# Patient Record
Sex: MALE | Race: WHITE | NOT HISPANIC OR LATINO | Employment: FULL TIME | URBAN - METROPOLITAN AREA
[De-identification: names, ages, dates, MRNs, and addresses within clinical notes are randomized per-mention and may not be internally consistent; named-entity substitution may affect disease eponyms.]

---

## 2017-01-24 ENCOUNTER — OFFICE VISIT - HEALTHEAST (OUTPATIENT)
Dept: FAMILY MEDICINE | Facility: CLINIC | Age: 52
End: 2017-01-24

## 2017-01-24 DIAGNOSIS — J32.9 SINUSITIS: ICD-10-CM

## 2017-01-24 ASSESSMENT — MIFFLIN-ST. JEOR: SCORE: 2134.46

## 2017-03-22 ENCOUNTER — RECORDS - HEALTHEAST (OUTPATIENT)
Dept: ADMINISTRATIVE | Facility: OTHER | Age: 52
End: 2017-03-22

## 2019-05-24 ENCOUNTER — OFFICE VISIT - HEALTHEAST (OUTPATIENT)
Dept: FAMILY MEDICINE | Facility: CLINIC | Age: 54
End: 2019-05-24

## 2019-05-24 DIAGNOSIS — E66.09 CLASS 2 OBESITY DUE TO EXCESS CALORIES WITHOUT SERIOUS COMORBIDITY WITH BODY MASS INDEX (BMI) OF 36.0 TO 36.9 IN ADULT: ICD-10-CM

## 2019-05-24 DIAGNOSIS — E66.812 CLASS 2 OBESITY DUE TO EXCESS CALORIES WITHOUT SERIOUS COMORBIDITY WITH BODY MASS INDEX (BMI) OF 36.0 TO 36.9 IN ADULT: ICD-10-CM

## 2019-05-24 DIAGNOSIS — F10.10 ALCOHOL ABUSE: ICD-10-CM

## 2019-05-24 DIAGNOSIS — K57.30 DIVERTICULOSIS OF LARGE INTESTINE WITHOUT HEMORRHAGE: ICD-10-CM

## 2019-05-24 DIAGNOSIS — K42.9 UMBILICAL HERNIA WITHOUT OBSTRUCTION AND WITHOUT GANGRENE: ICD-10-CM

## 2019-05-24 DIAGNOSIS — M25.551 HIP PAIN, RIGHT: ICD-10-CM

## 2019-05-24 DIAGNOSIS — Z00.00 ROUTINE GENERAL MEDICAL EXAMINATION AT A HEALTH CARE FACILITY: ICD-10-CM

## 2019-05-24 LAB
ALBUMIN SERPL-MCNC: 4.1 G/DL (ref 3.5–5)
ALP SERPL-CCNC: 79 U/L (ref 45–120)
ALT SERPL W P-5'-P-CCNC: 22 U/L (ref 0–45)
ANION GAP SERPL CALCULATED.3IONS-SCNC: 10 MMOL/L (ref 5–18)
AST SERPL W P-5'-P-CCNC: 22 U/L (ref 0–40)
BILIRUB SERPL-MCNC: 0.4 MG/DL (ref 0–1)
BUN SERPL-MCNC: 17 MG/DL (ref 8–22)
CALCIUM SERPL-MCNC: 9.7 MG/DL (ref 8.5–10.5)
CHLORIDE BLD-SCNC: 104 MMOL/L (ref 98–107)
CHOLEST SERPL-MCNC: 279 MG/DL
CO2 SERPL-SCNC: 25 MMOL/L (ref 22–31)
CREAT SERPL-MCNC: 1.21 MG/DL (ref 0.7–1.3)
ERYTHROCYTE [DISTWIDTH] IN BLOOD BY AUTOMATED COUNT: 10.8 % (ref 11–14.5)
FASTING STATUS PATIENT QL REPORTED: YES
GFR SERPL CREATININE-BSD FRML MDRD: >60 ML/MIN/1.73M2
GGT SERPL-CCNC: 28 U/L (ref 0–50)
GLUCOSE BLD-MCNC: 89 MG/DL (ref 70–125)
HBA1C MFR BLD: 5.6 % (ref 3.5–6)
HCT VFR BLD AUTO: 45.6 % (ref 40–54)
HDLC SERPL-MCNC: 44 MG/DL
HGB BLD-MCNC: 15.3 G/DL (ref 14–18)
MCH RBC QN AUTO: 31.5 PG (ref 27–34)
MCHC RBC AUTO-ENTMCNC: 33.6 G/DL (ref 32–36)
MCV RBC AUTO: 94 FL (ref 80–100)
PLATELET # BLD AUTO: 214 THOU/UL (ref 140–440)
PMV BLD AUTO: 6.8 FL (ref 7–10)
POTASSIUM BLD-SCNC: 4.1 MMOL/L (ref 3.5–5)
PROT SERPL-MCNC: 7.1 G/DL (ref 6–8)
PSA SERPL-MCNC: 0.4 NG/ML (ref 0–3.5)
RBC # BLD AUTO: 4.86 MILL/UL (ref 4.4–6.2)
SODIUM SERPL-SCNC: 139 MMOL/L (ref 136–145)
WBC: 5.3 THOU/UL (ref 4–11)

## 2019-05-24 ASSESSMENT — MIFFLIN-ST. JEOR: SCORE: 2163.5

## 2019-06-04 ENCOUNTER — RECORDS - HEALTHEAST (OUTPATIENT)
Dept: ADMINISTRATIVE | Facility: OTHER | Age: 54
End: 2019-06-04

## 2019-06-07 ENCOUNTER — OFFICE VISIT - HEALTHEAST (OUTPATIENT)
Dept: SURGERY | Facility: CLINIC | Age: 54
End: 2019-06-07

## 2019-06-07 DIAGNOSIS — K42.0 UMBILICAL HERNIA WITH OBSTRUCTION: ICD-10-CM

## 2019-06-07 ASSESSMENT — MIFFLIN-ST. JEOR: SCORE: 2154.88

## 2019-06-20 ENCOUNTER — RECORDS - HEALTHEAST (OUTPATIENT)
Dept: ADMINISTRATIVE | Facility: OTHER | Age: 54
End: 2019-06-20

## 2019-06-20 ENCOUNTER — COMMUNICATION - HEALTHEAST (OUTPATIENT)
Dept: FAMILY MEDICINE | Facility: CLINIC | Age: 54
End: 2019-06-20

## 2019-07-02 ENCOUNTER — AMBULATORY - HEALTHEAST (OUTPATIENT)
Dept: SURGERY | Facility: CLINIC | Age: 54
End: 2019-07-02

## 2019-07-16 ENCOUNTER — OFFICE VISIT - HEALTHEAST (OUTPATIENT)
Dept: FAMILY MEDICINE | Facility: CLINIC | Age: 54
End: 2019-07-16

## 2019-07-16 DIAGNOSIS — R06.83 SNORING: ICD-10-CM

## 2019-07-16 DIAGNOSIS — R03.0 ELEVATED BLOOD PRESSURE READING WITHOUT DIAGNOSIS OF HYPERTENSION: ICD-10-CM

## 2019-07-16 DIAGNOSIS — K42.9 UMBILICAL HERNIA WITHOUT OBSTRUCTION AND WITHOUT GANGRENE: ICD-10-CM

## 2019-07-16 DIAGNOSIS — E66.09 CLASS 2 OBESITY DUE TO EXCESS CALORIES WITHOUT SERIOUS COMORBIDITY WITH BODY MASS INDEX (BMI) OF 36.0 TO 36.9 IN ADULT: ICD-10-CM

## 2019-07-16 DIAGNOSIS — E78.00 HYPERCHOLESTEROLEMIA: ICD-10-CM

## 2019-07-16 DIAGNOSIS — M25.551 HIP PAIN, RIGHT: ICD-10-CM

## 2019-07-16 DIAGNOSIS — E66.812 CLASS 2 OBESITY DUE TO EXCESS CALORIES WITHOUT SERIOUS COMORBIDITY WITH BODY MASS INDEX (BMI) OF 36.0 TO 36.9 IN ADULT: ICD-10-CM

## 2019-07-16 DIAGNOSIS — Z01.810 PRE-OPERATIVE CARDIOVASCULAR EXAMINATION: ICD-10-CM

## 2019-07-16 LAB
ANION GAP SERPL CALCULATED.3IONS-SCNC: 6 MMOL/L (ref 5–18)
ATRIAL RATE - MUSE: 57 BPM
BUN SERPL-MCNC: 14 MG/DL (ref 8–22)
CALCIUM SERPL-MCNC: 9.6 MG/DL (ref 8.5–10.5)
CHLORIDE BLD-SCNC: 104 MMOL/L (ref 98–107)
CHOLEST SERPL-MCNC: 262 MG/DL
CO2 SERPL-SCNC: 29 MMOL/L (ref 22–31)
CREAT SERPL-MCNC: 1.2 MG/DL (ref 0.7–1.3)
DIASTOLIC BLOOD PRESSURE - MUSE: NORMAL MMHG
ERYTHROCYTE [DISTWIDTH] IN BLOOD BY AUTOMATED COUNT: 10.8 % (ref 11–14.5)
FASTING STATUS PATIENT QL REPORTED: YES
GFR SERPL CREATININE-BSD FRML MDRD: >60 ML/MIN/1.73M2
GLUCOSE BLD-MCNC: 89 MG/DL (ref 70–125)
HCT VFR BLD AUTO: 44.6 % (ref 40–54)
HDLC SERPL-MCNC: 48 MG/DL
HGB BLD-MCNC: 15 G/DL (ref 14–18)
INTERPRETATION ECG - MUSE: NORMAL
LDLC SERPL CALC-MCNC: 181 MG/DL
MCH RBC QN AUTO: 31.6 PG (ref 27–34)
MCHC RBC AUTO-ENTMCNC: 33.8 G/DL (ref 32–36)
MCV RBC AUTO: 94 FL (ref 80–100)
P AXIS - MUSE: 54 DEGREES
PLATELET # BLD AUTO: 230 THOU/UL (ref 140–440)
PMV BLD AUTO: 7.3 FL (ref 7–10)
POTASSIUM BLD-SCNC: 4.7 MMOL/L (ref 3.5–5)
PR INTERVAL - MUSE: 142 MS
QRS DURATION - MUSE: 88 MS
QT - MUSE: 408 MS
QTC - MUSE: 397 MS
R AXIS - MUSE: 30 DEGREES
RBC # BLD AUTO: 4.76 MILL/UL (ref 4.4–6.2)
SODIUM SERPL-SCNC: 139 MMOL/L (ref 136–145)
SYSTOLIC BLOOD PRESSURE - MUSE: NORMAL MMHG
T AXIS - MUSE: 17 DEGREES
TRIGL SERPL-MCNC: 163 MG/DL
TSH SERPL DL<=0.005 MIU/L-ACNC: 1.48 UIU/ML (ref 0.3–5)
VENTRICULAR RATE- MUSE: 57 BPM
WBC: 4.6 THOU/UL (ref 4–11)

## 2019-07-16 ASSESSMENT — MIFFLIN-ST. JEOR: SCORE: 2134.46

## 2019-07-18 ENCOUNTER — RECORDS - HEALTHEAST (OUTPATIENT)
Dept: ADMINISTRATIVE | Facility: OTHER | Age: 54
End: 2019-07-18

## 2019-07-25 ENCOUNTER — ANESTHESIA - HEALTHEAST (OUTPATIENT)
Dept: SURGERY | Facility: AMBULATORY SURGERY CENTER | Age: 54
End: 2019-07-25

## 2019-07-25 ASSESSMENT — MIFFLIN-ST. JEOR: SCORE: 2102.71

## 2019-07-26 ENCOUNTER — SURGERY - HEALTHEAST (OUTPATIENT)
Dept: SURGERY | Facility: AMBULATORY SURGERY CENTER | Age: 54
End: 2019-07-26

## 2019-07-26 ASSESSMENT — MIFFLIN-ST. JEOR: SCORE: 2102.71

## 2019-08-08 ENCOUNTER — OFFICE VISIT - HEALTHEAST (OUTPATIENT)
Dept: SURGERY | Facility: CLINIC | Age: 54
End: 2019-08-08

## 2019-08-08 DIAGNOSIS — Z48.89 POSTOPERATIVE VISIT: ICD-10-CM

## 2019-08-08 ASSESSMENT — MIFFLIN-ST. JEOR: SCORE: 2098.18

## 2019-10-31 ENCOUNTER — OFFICE VISIT - HEALTHEAST (OUTPATIENT)
Dept: FAMILY MEDICINE | Facility: CLINIC | Age: 54
End: 2019-10-31

## 2019-10-31 DIAGNOSIS — E78.5 HYPERLIPIDEMIA LDL GOAL <100: ICD-10-CM

## 2019-10-31 DIAGNOSIS — M25.551 HIP PAIN, RIGHT: ICD-10-CM

## 2019-10-31 DIAGNOSIS — I10 ESSENTIAL HYPERTENSION, BENIGN: ICD-10-CM

## 2019-10-31 DIAGNOSIS — M16.0 PRIMARY OSTEOARTHRITIS OF BOTH HIPS: ICD-10-CM

## 2019-11-19 ENCOUNTER — RECORDS - HEALTHEAST (OUTPATIENT)
Dept: ADMINISTRATIVE | Facility: OTHER | Age: 54
End: 2019-11-19

## 2019-12-06 ENCOUNTER — RECORDS - HEALTHEAST (OUTPATIENT)
Dept: ADMINISTRATIVE | Facility: OTHER | Age: 54
End: 2019-12-06

## 2020-01-16 ENCOUNTER — RECORDS - HEALTHEAST (OUTPATIENT)
Dept: ADMINISTRATIVE | Facility: OTHER | Age: 55
End: 2020-01-16

## 2020-01-22 ENCOUNTER — COMMUNICATION - HEALTHEAST (OUTPATIENT)
Dept: FAMILY MEDICINE | Facility: CLINIC | Age: 55
End: 2020-01-22

## 2020-02-26 ENCOUNTER — OFFICE VISIT - HEALTHEAST (OUTPATIENT)
Dept: FAMILY MEDICINE | Facility: CLINIC | Age: 55
End: 2020-02-26

## 2020-02-26 DIAGNOSIS — I10 ESSENTIAL HYPERTENSION, BENIGN: ICD-10-CM

## 2020-02-26 DIAGNOSIS — Z01.818 PREOPERATIVE EXAMINATION: ICD-10-CM

## 2020-02-26 DIAGNOSIS — M16.11 PRIMARY OSTEOARTHRITIS OF RIGHT HIP: ICD-10-CM

## 2020-02-26 DIAGNOSIS — R06.83 SNORING: ICD-10-CM

## 2020-02-26 DIAGNOSIS — E78.5 HYPERLIPIDEMIA LDL GOAL <100: ICD-10-CM

## 2020-02-26 LAB
ANION GAP SERPL CALCULATED.3IONS-SCNC: 10 MMOL/L (ref 5–18)
BUN SERPL-MCNC: 17 MG/DL (ref 8–22)
CALCIUM SERPL-MCNC: 9.7 MG/DL (ref 8.5–10.5)
CHLORIDE BLD-SCNC: 103 MMOL/L (ref 98–107)
CO2 SERPL-SCNC: 27 MMOL/L (ref 22–31)
CREAT SERPL-MCNC: 1.23 MG/DL (ref 0.7–1.3)
ERYTHROCYTE [DISTWIDTH] IN BLOOD BY AUTOMATED COUNT: 11.8 % (ref 11–14.5)
GFR SERPL CREATININE-BSD FRML MDRD: >60 ML/MIN/1.73M2
GLUCOSE BLD-MCNC: 87 MG/DL (ref 70–125)
HCT VFR BLD AUTO: 43.4 % (ref 40–54)
HGB BLD-MCNC: 14.7 G/DL (ref 14–18)
MCH RBC QN AUTO: 30.7 PG (ref 27–34)
MCHC RBC AUTO-ENTMCNC: 33.8 G/DL (ref 32–36)
MCV RBC AUTO: 91 FL (ref 80–100)
PLATELET # BLD AUTO: 229 THOU/UL (ref 140–440)
PMV BLD AUTO: 7.8 FL (ref 7–10)
POTASSIUM BLD-SCNC: 4.6 MMOL/L (ref 3.5–5)
RBC # BLD AUTO: 4.78 MILL/UL (ref 4.4–6.2)
SODIUM SERPL-SCNC: 140 MMOL/L (ref 136–145)
WBC: 6.5 THOU/UL (ref 4–11)

## 2020-02-26 ASSESSMENT — MIFFLIN-ST. JEOR: SCORE: 2148.64

## 2020-02-27 LAB
ATRIAL RATE - MUSE: 67 BPM
DIASTOLIC BLOOD PRESSURE - MUSE: NORMAL
INTERPRETATION ECG - MUSE: NORMAL
P AXIS - MUSE: 50 DEGREES
PR INTERVAL - MUSE: 148 MS
QRS DURATION - MUSE: 86 MS
QT - MUSE: 402 MS
QTC - MUSE: 424 MS
R AXIS - MUSE: 32 DEGREES
SYSTOLIC BLOOD PRESSURE - MUSE: NORMAL
T AXIS - MUSE: 21 DEGREES
VENTRICULAR RATE- MUSE: 67 BPM

## 2020-03-05 ENCOUNTER — RECORDS - HEALTHEAST (OUTPATIENT)
Dept: ADMINISTRATIVE | Facility: OTHER | Age: 55
End: 2020-03-05

## 2020-03-19 ENCOUNTER — RECORDS - HEALTHEAST (OUTPATIENT)
Dept: ADMINISTRATIVE | Facility: OTHER | Age: 55
End: 2020-03-19

## 2020-04-28 ENCOUNTER — COMMUNICATION - HEALTHEAST (OUTPATIENT)
Dept: FAMILY MEDICINE | Facility: CLINIC | Age: 55
End: 2020-04-28

## 2020-05-21 ENCOUNTER — OFFICE VISIT - HEALTHEAST (OUTPATIENT)
Dept: FAMILY MEDICINE | Facility: CLINIC | Age: 55
End: 2020-05-21

## 2020-05-21 DIAGNOSIS — I10 ESSENTIAL HYPERTENSION, BENIGN: ICD-10-CM

## 2020-05-21 DIAGNOSIS — E78.5 HYPERLIPIDEMIA LDL GOAL <100: ICD-10-CM

## 2020-05-21 DIAGNOSIS — M16.11 PRIMARY OSTEOARTHRITIS OF RIGHT HIP: ICD-10-CM

## 2020-05-27 ENCOUNTER — RECORDS - HEALTHEAST (OUTPATIENT)
Dept: ADMINISTRATIVE | Facility: OTHER | Age: 55
End: 2020-05-27

## 2020-10-13 ENCOUNTER — COMMUNICATION - HEALTHEAST (OUTPATIENT)
Dept: FAMILY MEDICINE | Facility: CLINIC | Age: 55
End: 2020-10-13

## 2020-10-13 DIAGNOSIS — I10 ESSENTIAL HYPERTENSION, BENIGN: ICD-10-CM

## 2021-04-29 ENCOUNTER — AMBULATORY - HEALTHEAST (OUTPATIENT)
Dept: NURSING | Facility: CLINIC | Age: 56
End: 2021-04-29

## 2021-05-20 ENCOUNTER — AMBULATORY - HEALTHEAST (OUTPATIENT)
Dept: NURSING | Facility: CLINIC | Age: 56
End: 2021-05-20

## 2021-05-24 ENCOUNTER — RECORDS - HEALTHEAST (OUTPATIENT)
Dept: ADMINISTRATIVE | Facility: CLINIC | Age: 56
End: 2021-05-24

## 2021-05-25 ENCOUNTER — RECORDS - HEALTHEAST (OUTPATIENT)
Dept: ADMINISTRATIVE | Facility: CLINIC | Age: 56
End: 2021-05-25

## 2021-05-29 NOTE — TELEPHONE ENCOUNTER
Who is calling:  Patient incorrectly self scheduled via [facility]  Reason for Call:    [facility] visit for a pre op appointment   Not enough time allowed for non-clinic staff to change to the correct appointment type.  Clinic please ONLY call patient if this needs to be rescheduled.    Okay to leave a detailed message: Patient incorrectly self scheduled appointment via [facility]

## 2021-05-29 NOTE — PROGRESS NOTES
"HPI:  This is a 53 y.o. male here today with concerns of pain and bulging in his umbilicus area. He has noted this for the past few month(s). The symptoms have progressed and increased over this time. He comes in for evaluation secondary to the hernia causing enough issues to bother them with daily activities or chores.    Allergies:Penicillins    Past Medical History:   Diagnosis Date     Diverticular disease 12/16/2016       Past Surgical History:   Procedure Laterality Date     COLONOSCOPY  2016     LASIK         CURRENT MEDS:      Family History   Problem Relation Age of Onset     Aneurysm Mother      Breast cancer Mother      Alcohol abuse Father      Liver disease Father      Heart disease Neg Hx      Colon cancer Neg Hx         reports that he has never smoked. He has never used smokeless tobacco. He reports that he drinks about 3.0 - 3.6 oz of alcohol per week. He reports that he does not use drugs.    Review of Systems - Negative except umbilical bulge and pain. Otherwise twelve system of review is negative.      Vitals:    06/07/19 1224   BP: 128/82   Patient Site: Right Arm   Patient Position: Sitting   Cuff Size: Adult Large   Pulse: 76   SpO2: 95%   Weight: (!) 278 lb (126.1 kg)   Height: 6' 1\" (1.854 m)       Body mass index is 36.68 kg/m .    EXAM:  General: NAD   HEENT: normocephalic, PERRLA and EOMS intact  Mounth: Mucus membranes moist  Neck: Supple  Chest: Clear to auscultation bilaterally  CV: RRR  ABD: Soft nontender and nondistended, umbilical hernia, reducible  EXT: Warm, pulses intact,   Neuro: Alert and oriented x3  Back: no CVA tenderness      Assessment/Plan: Pt with a umbilical hernia. I discussed this at length with He.  I went over conservative management as well as surgical treatment of this.. I would plan on doing this via anopen  approach with possible use of mesh. I went over the small risks of surgery including but not limited to bleeding and infection, anesthesia, recurrence rates " and nerve injury. I discussed the expected recovery time as well. Will get this scheduled. He will contact us to have this scheduled.      Sheldon Wallace MD  [facility] Department of Surgery

## 2021-05-29 NOTE — PROGRESS NOTES
Assessment/Plan:     1. Routine general medical examination at a health care facility  Routine healthcare maintenance.  Preventative cares reviewed.  Screening PSA obtained with history of testosterone use with anabolic steroids.  Yearly physicals to continue.  Completed colonoscopy March 22, 2017 with diverticulosis in descending and sigmoid colon otherwise told her repeat 10-year interval.  - PSA (Prostatic-Specific Antigen), Annual Screen    2. Class 2 obesity due to excess calories without serious comorbidity with body mass index (BMI) of 36.0 to 36.9 in adult  Dietary and exercise modifications reviewed for weight goal less than 270 pounds initially, less than 260 pounds ideally.  Check A1c as well as total and HDL cholesterol levels today, not.  Goal ratio less than 4.  - Glycosylated Hemoglobin A1c  - HDL Cholesterol  - Cholesterol, Total    3. Hip pain, right  Right hip pain likely soft tissue etiology without evidence for osteoarthritis.  Referred to orthopedic surgery for further evaluation.  Patient does not feel that this is consistent with lumbar radiculopathy or sciatica.  - Ambulatory referral to Orthopedic Surgery    4. Umbilical hernia without obstruction and without gangrene  Umbilical hernia, large, reducible with some tenderness.  Referred to general surgery for further treatment options.  - Ambulatory referral to General Surgery    5. Alcohol abuse  Described alcohol abuse with likely greater than 12 beer per week more on weekends.  Check GGT, CBC and conference of metabolic panel.  Moderation of alcohol to no more than 2 ounces daily.  - GGT (Gamma GT)  - Comprehensive Metabolic Panel  - HM2(CBC w/o Differential)    6. Diverticulosis of large intestine without hemorrhage  History diverticulosis currently asymptomatic.  Notify of concerns for acute diverticulitis.       I have had an Advance Directives discussion with the patient.  The following high BMI interventions were performed this  visit: encouragement to exercise, weight monitoring, weight loss from baseline weight and lifestyle education regarding diet .  Ensure ongoing efforts to achieve weight goal < 270 pounds initially, < 260 pounds ideally.         Subjective:      Mike Vieyra is a 53 y.o. male who presents for an annual exam.  In general doing well.  Has an umbilical hernia less enlarging.  Would like to get this taken care of.  Hurts at times.  Has had more stress as an .  Drinks more on weekends especially and will get intoxicated at times.  Prefers IPA beer.  Injured right lower extremity on a manhole in [facility] with resolving foot and knee pain however persistent right hip issues.  It hurts more into right groin.  No history of hernia of inguinal canal.  History of diverticulosis.  Self treats for potential diverticulitis occasionally.  Took your off from consistent workouts otherwise enjoys bodybuilding.  Has gained weight.  Comprehensive review of systems as above otherwise all negative.     - Lita  1 daughter - Glenna  Tobacco: none  EtOH: 12 beer/week  Mom - breast CA (survivor); cerebral aneurysm with ST memory loss  Dad - h/o alcoholism  1 younger bro -   2 younger sis -   Surgeries: LASIK  Hospitalizations: none  Work:  ([employer])  Hobbies: body building ([employer]); theme [employer], [employer]    Healthy Habits:   Regular Exercise: Yes  Healthy Diet: Yes  Dental Visits Regularly: Yes  Seat Belt: Yes   Sexually active: Yes  Colonoscopy: Yes and 3/22/17 (diverticulosis o/w normal) - repeat in 10 years  Lipid Profile: Yes  Glucose Screen: Yes    Immunization History   Administered Date(s) Administered     Tdap 08/30/2015     Immunization status: up to date and documented.  Vision Screening:both eyes  Hearing: PASS     No current outpatient medications on file.     No current facility-administered medications for this visit.      Past Medical History:   Diagnosis Date     Diverticular  "disease 12/16/2016     Past Surgical History:   Procedure Laterality Date     LASIK       Penicillins  Family History   Problem Relation Age of Onset     Aneurysm Mother      Breast cancer Mother      Alcohol abuse Father      Liver disease Father      Heart disease Neg Hx      Colon cancer Neg Hx      Social History     Socioeconomic History     Marital status:      Spouse name: Not on file     Number of children: Not on file     Years of education: Not on file     Highest education level: Not on file   Occupational History     Not on file   Social Needs     Financial resource strain: Not on file     Food insecurity:     Worry: Not on file     Inability: Not on file     Transportation needs:     Medical: Not on file     Non-medical: Not on file   Tobacco Use     Smoking status: Never Smoker     Smokeless tobacco: Never Used   Substance and Sexual Activity     Alcohol use: Yes     Alcohol/week: 3.0 - 3.6 oz     Types: 5 - 6 Cans of beer per week     Drug use: No     Sexual activity: Not on file   Lifestyle     Physical activity:     Days per week: Not on file     Minutes per session: Not on file     Stress: Not on file   Relationships     Social connections:     Talks on phone: Not on file     Gets together: Not on file     Attends Druze service: Not on file     Active member of club or organization: Not on file     Attends meetings of clubs or organizations: Not on file     Relationship status: Not on file     Intimate partner violence:     Fear of current or ex partner: Not on file     Emotionally abused: Not on file     Physically abused: Not on file     Forced sexual activity: Not on file   Other Topics Concern     Not on file   Social History Narrative     Not on file       Review of Systems  Comprehensive ROS: as above, otherwise all negative.           Objective:     BP (!) 140/96 (Patient Site: Left Arm, Patient Position: Sitting, Cuff Size: Adult Large)   Pulse 61   Ht 6' 1\" (1.854 m)   Wt (!) " 279 lb 14.4 oz (127 kg)   SpO2 98%   BMI 36.93 kg/m    Body mass index is 36.93 kg/m .    Physical    General Appearance:    Alert, cooperative, no distress, appears stated age.  Well-developed well-nourished however does carry additional weight consistent with obesity.   Head:    Normocephalic, without obvious abnormality, atraumatic   Eyes:    PERRL, conjunctiva/corneas clear, EOM's intact, fundi     benign, both eyes        Ears:    Normal TM's and external ear canals, both ears   Nose:   Nares normal, septum midline, mucosa normal, no drainage    or sinus tenderness   Throat:   Lips, mucosa, and tongue normal; teeth and gums normal   Neck:   Supple, symmetrical, trachea midline, no adenopathy;        thyroid:  No enlargement/tenderness/nodules; no carotid    bruit or JVD   Back:     Symmetric, no curvature, ROM normal, no CVA tenderness   Lungs:     Clear to auscultation bilaterally, respirations unlabored   Chest wall:    No tenderness or deformity   Heart:    Regular rate and rhythm, S1 and S2 normal, no murmur, rub   or gallop   Abdomen:     Soft, non-tender, bowel sounds active all four quadrants,     no masses, no organomegaly.  Large reducible umbilical hernia.   Genitalia:    Normal male without lesion, discharge or tenderness.  No inguinal hernia noted.     Rectal:    Normal tone.  Prostate normal/symmetric, no masses or tenderness.   Extremities:   Extremities normal, atraumatic, no cyanosis or edema.bilateral hip range of motion appears intact.   Pulses:   2+ and symmetric all extremities   Skin:   Skin color, texture, turgor normal, no rashes or lesions   Lymph nodes:   Cervical, supraclavicular, and axillary nodes normal   Neurologic:   CNII-XII intact. Normal strength, sensation and reflexes       throughout                This note has been dictated using voice recognition software and as a result may contain minor grammatical errors and unintended word substitutions.

## 2021-05-30 VITALS — BODY MASS INDEX: 37.52 KG/M2 | HEIGHT: 72 IN | WEIGHT: 277 LBS

## 2021-05-30 NOTE — PROGRESS NOTES
Preoperative Exam    Scheduled Procedure: umbilical hernia repair  Surgery Date:  7/26/19  Surgery Location: [facility], fax [phone]    Surgeon:  Dr. Wallace    Assessment/Plan:     1. Pre-operative cardiovascular examination  Preoperative cardiovascular examination completed.  Large umbilical hernia, reducible.  No contraindication to scheduled surgery identified.    2. Umbilical hernia without obstruction and without gangrene  As above, large reducible umbilical hernia present.  Scheduled umbilical hernia repair noted.    3. Hypercholesterolemia  History of hypercholesterolemia.  Lipid cascade obtained today while fasting.  Therapeutic lifestyle changes for weight goal less than 270 pounds initially, less than 260 pounds ideally.  - Lipid Cascade    4. Elevated blood pressure reading without diagnosis of hypertension  Elevated blood pressure readings noted.  Blood pressure 146/98 on recheck.  Electrocardiogram to be completed today.  Noted sinus bradycardia otherwise normal EKG with ventricular rate 57 bpm.  No evidence for LVH.  Basic metabolic panel, TSH and CBC obtained.  Monitor blood pressures for goal less than 130/80 outside of office with reassessment in office no later than 3 months.  Antihypertensive medication if persistent elevation recommended.  - Electrocardiogram Perform and Read  - Basic Metabolic Panel  - Thyroid Stimulating Hormone (TSH)  - HM2(CBC w/o Differential)    5. Class 2 obesity due to excess calories without serious comorbidity with body mass index (BMI) of 36.0 to 36.9 in adult  As above, dietary and exercise modification for weight goal less than 270 pounds initially, less than 260 pounds ideally.    6. Hip pain, right  Stem cell procedure scheduled next Thursday with orthopedic specialist.    7. Snoring  Snoring history without known history of obstructive sleep apnea.  Has not had prior sleep study.  Consider completing this fall if ongoing concerns or if  continued blood pressure elevation.          Surgical Procedure Risk: Low (reported cardiac risk generally < 1%)  Have you had prior anesthesia?: Yes  Have you or any family members had a previous anesthesia reaction:  No  Do you or any family members have a history of a clotting or bleeding disorder?: No  Cardiac Risk Assessment: no increased risk for major cardiac complications    APPROVAL GIVEN to proceed with proposed procedure, without further diagnostic evaluation    Functional Status: Independent  Patient plans to recover at home with family.     Subjective:      Mike Vieyra is a 53 y.o. male who presents for a preoperative consultation.  Umbilical hernia.  Scheduled repair as noted.  Doing well without recent illness.  Has had blood pressure elevation previously noted at time of physical in May 2019.  Has had blood pressure readings outside of office that have been at the upper end of normal.  Prior physical with cluster elevation, nonfasting.  Patient is fasting today however.  Right hip pain, chronic.  Stem cell procedure scheduled for next Thursday apparently.  Will need to remain out of routine workout schedule due to umbilical hernia repair for approximately 1 month.  No palpitations.  Past medical social and family history reviewed updated as noted below.    All other systems reviewed and are negative, other than those listed in the HPI.     - Lita  1 daughter - Glenna  Tobacco: none  EtOH: 12 beer/week  Mom - breast CA (survivor); cerebral aneurysm with ST memory loss  Dad - h/o alcoholism  1 younger bro -   2 younger sis -   Surgeries: LASIK  Hospitalizations: none  Work:  ([employer])  Hobbies: body building ([employer]); theme [employer], [employer]    Pertinent History  Do you have difficulty breathing or chest pain after walking up a flight of stairs: No  History of obstructive sleep apnea: No  Steroid use in the last 6 months: No  Frequent Aspirin/NSAID use: Yes: advil  - but stopped this week due to surgery  Prior Blood Transfusion: No  Prior Blood Transfusion Reaction: No  If for some reason prior to, during or after the procedure, if it is medically indicated, would you be willing to have a blood transfusion?:  There is no transfusion refusal.    Current Outpatient Medications   Medication Sig Dispense Refill     multivitamin with minerals (THERA-M) 9 mg iron-400 mcg Tab tablet Take 1 tablet by mouth daily.       No current facility-administered medications for this visit.         Allergies   Allergen Reactions     Penicillins Rash       Patient Active Problem List   Diagnosis     Diverticular disease     Diverticulosis of large intestine without hemorrhage     Alcohol abuse     Umbilical hernia without obstruction and without gangrene     Hip pain, right     Class 2 obesity due to excess calories without serious comorbidity with body mass index (BMI) of 36.0 to 36.9 in adult     Umbilical hernia       Past Medical History:   Diagnosis Date     Diverticular disease 12/16/2016       Past Surgical History:   Procedure Laterality Date     COLONOSCOPY  2016     LASIK         Social History     Socioeconomic History     Marital status:      Spouse name: Not on file     Number of children: Not on file     Years of education: Not on file     Highest education level: Not on file   Occupational History     Not on file   Social Needs     Financial resource strain: Not on file     Food insecurity:     Worry: Not on file     Inability: Not on file     Transportation needs:     Medical: Not on file     Non-medical: Not on file   Tobacco Use     Smoking status: Never Smoker     Smokeless tobacco: Never Used   Substance and Sexual Activity     Alcohol use: Yes     Alcohol/week: 3.0 - 3.6 oz     Types: 5 - 6 Cans of beer per week     Drug use: No     Sexual activity: Not on file   Lifestyle     Physical activity:     Days per week: Not on file     Minutes per session: Not on file      Stress: Not on file   Relationships     Social connections:     Talks on phone: Not on file     Gets together: Not on file     Attends Pentecostalism service: Not on file     Active member of club or organization: Not on file     Attends meetings of clubs or organizations: Not on file     Relationship status: Not on file     Intimate partner violence:     Fear of current or ex partner: Not on file     Emotionally abused: Not on file     Physically abused: Not on file     Forced sexual activity: Not on file   Other Topics Concern     Not on file   Social History Narrative     Not on file       Patient Care Team:  Seth Short MD as PCP - General (Family Medicine)          Objective:     Vitals:    07/16/19 0853 07/16/19 0856 07/16/19 0938   BP: (!) 158/100 (!) 150/100 (!) 146/98   Pulse: 70     SpO2: 98%     Weight: (!) 277 lb (125.6 kg)     Height: 6' (1.829 m)           Physical Exam:  Physical Exam     General Appearance:    Alert, cooperative, no distress, appears stated age.  Well developed, well nourishes with h/o associated obesity based on BMI > 30.   Head:    Normocephalic, without obvious abnormality, atraumatic   Eyes:    PERRL, conjunctiva/corneas clear, EOM's intact, fundi     benign, both eyes        Ears:    Normal TM's and external ear canals, both ears   Nose:   Nares normal, septum midline, mucosa normal, no drainage    or sinus tenderness   Throat:   Lips, mucosa, and tongue normal; teeth and gums normal   Neck:   Supple, symmetrical, trachea midline, no adenopathy;        thyroid:  No enlargement/tenderness/nodules; no carotid    bruit or JVD   Back:     Symmetric, no curvature, ROM normal, no CVA tenderness   Lungs:     Clear to auscultation bilaterally, respirations unlabored   Chest wall:    No tenderness or deformity   Heart:    Regular rate and rhythm, S1 and S2 normal, no murmur, rub   or gallop   Abdomen:     Soft, non-tender, bowel sounds active all four quadrants,     no masses, no  organomegaly.  Large reducible umbilical hernia.   Genitalia:    deferred   Rectal:    deferred   Extremities:   Extremities normal, atraumatic, no cyanosis or edema   Pulses:   2+ and symmetric all extremities   Skin:   Skin color, texture, turgor normal, no rashes or lesions   Lymph nodes:   Cervical, supraclavicular, and axillary nodes normal   Neurologic:   CNII-XII intact. Normal strength, sensation and reflexes       throughout          There are no Patient Instructions on file for this visit.    EKG: Sinus bradycardia otherwise normal EKG.  Ventricular rate 57 bpm.    Labs:  Recent Results (from the past 24 hour(s))   Electrocardiogram Perform and Read    Collection Time: 07/16/19  9:43 AM   Result Value Ref Range    SYSTOLIC BLOOD PRESSURE  mmHg    DIASTOLIC BLOOD PRESSURE  mmHg    VENTRICULAR RATE 57 BPM    ATRIAL RATE 57 BPM    P-R INTERVAL 142 ms    QRS DURATION 88 ms    Q-T INTERVAL 408 ms    QTC CALCULATION (BEZET) 397 ms    P Axis 54 degrees    R AXIS 30 degrees    T AXIS 17 degrees    MUSE DIAGNOSIS       ** Poor data quality, interpretation may be adversely affected  Sinus bradycardia  Otherwise normal ECG  When compared with ECG of 03-MAY-2001 11:38,  No significant change was found         Immunization History   Administered Date(s) Administered     Tdap 08/30/2015           Electronically signed by Seth Short MD 07/16/19 8:48 AM

## 2021-05-30 NOTE — ANESTHESIA POSTPROCEDURE EVALUATION
Patient: Mike Vieyra  REPAIR, HERNIA, UMBILICAL, OPEN  Anesthesia type: MAC    Patient location: Phase II Recovery  Last vitals:   Vitals Value Taken Time   /77 7/26/2019  9:02 AM   Temp 36.5  C (97.7  F) 7/26/2019  8:13 AM   Pulse 76 7/26/2019  9:05 AM   Resp 16 7/26/2019  8:13 AM   SpO2 97 % 7/26/2019  9:05 AM   Vitals shown include unvalidated device data.  Post vital signs: stable  Level of consciousness: awake and responds to simple questions  Post-anesthesia pain: pain controlled  Post-anesthesia nausea and vomiting: no  Pulmonary: unassisted  Cardiovascular: stable  Hydration: adequate  Anesthetic events: no    QCDR Measures:  ASA# 11 - Anabel-op Cardiac Arrest: ASA11B - Patient did NOT experience unanticipated cardiac arrest  ASA# 12 - Anabel-op Mortality Rate: ASA12B - Patient did NOT die  ASA# 13 - PACU Re-Intubation Rate: NA - No ETT / LMA used for case  ASA# 10 - Composite Anes Safety: ASA10A - No serious adverse event    Additional Notes:

## 2021-05-30 NOTE — ANESTHESIA PREPROCEDURE EVALUATION
Anesthesia Evaluation      Patient summary reviewed   No history of anesthetic complications     Airway   Mallampati: II  Neck ROM: full   Pulmonary - negative ROS and normal exam                          Cardiovascular - negative ROS and normal exam   Neuro/Psych - negative ROS     Endo/Other    (+) obesity (bmi 36),      GI/Hepatic/Renal - negative ROS           Dental    (+) chipped                       Anesthesia Plan  Planned anesthetic: MAC    ASA 2     Anesthetic plan and risks discussed with: patient    Post-op plan: routine recovery

## 2021-05-30 NOTE — ANESTHESIA CARE TRANSFER NOTE
Last vitals:   Vitals:    07/26/19 0813   BP: (P) 98/55   Pulse: (P) 75   Resp: (P) 16   Temp: (P) 36.5  C (97.7  F)   SpO2: (P) 97%     Patient's level of consciousness is drowsy  Spontaneous respirations: yes  Maintains airway independently: yes  Dentition unchanged: yes  Oropharynx: oropharynx clear of all foreign objects    QCDR Measures:  ASA# 20 - Surgical Safety Checklist: WHO surgical safety checklist completed prior to induction    PQRS# 430 - Adult PONV Prevention: 4558F - Pt received => 2 anti-emetic agents (different classes) preop & intraop  ASA# 8 - Peds PONV Prevention: NA - Not pediatric patient, not GA or 2 or more risk factors NOT present  PQRS# 424 - Anabel-op Temp Management: 4559F - At least one body temp DOCUMENTED => 35.5C or 95.9F within required timeframe  PQRS# 426 - PACU Transfer Protocol: - Transfer of care checklist used  ASA# 14 - Acute Post-op Pain: ASA14B - Patient did NOT experience pain >= 7 out of 10

## 2021-05-30 NOTE — PROGRESS NOTES
Paperwork has been signed by Dr. Wallace, and faxed to [phone]. Will be sent to HIM to be scanned into the [facility].     Mckayla Rubin CMA ([facility])

## 2021-05-31 NOTE — PROGRESS NOTES
HPI: Pt is here for follow up open umbilical hernia repair with Dr. Wallace on 7/26/2019.   he is doing well.  Pain is well controlled.  No difficulties with the surgical wound/wounds.  he is eating well and denies fever and chills.         /80 (Patient Site: Right Arm, Patient Position: Sitting, Cuff Size: Adult Large)   Pulse 90   Ht 6' (1.829 m)   Wt (!) 269 lb (122 kg)   SpO2 96%   BMI 36.48 kg/m      EXAM:  GENERAL:Appears well  ABDOMEN:  Soft, +BS  SURGICAL WOUNDS:  Incisions healing well, no enduration or drainage.      Assessment/Plan: Doing well after surgery and should follow up as needed.    Clara Vargas , [facility] Surgery

## 2021-06-02 ENCOUNTER — RECORDS - HEALTHEAST (OUTPATIENT)
Dept: ADMINISTRATIVE | Facility: CLINIC | Age: 56
End: 2021-06-02

## 2021-06-02 NOTE — PROGRESS NOTES
Assessment/Plan:    1. Essential hypertension, benign  Hypertension new diagnosis.  Initiate losartan 50 mg daily.  Prior renal function normal.  Reassess at blood pressure check in 3 months.  - losartan (COZAAR) 50 MG tablet; Take 1 tablet (50 mg total) by mouth daily.  Dispense: 90 tablet; Refill: 1    2. Hip pain, right  Hip pain with moderate osteoarthritis on the right and mild on the left.  Had bone marrow aspirate concentrate injection with PRP through [facility] with Dr. Guardado.  Persistent concerns.  We will follow-up with Dr. Guardado to determine further treatment options.    3. Primary osteoarthritis of both hips  As above.    4.  Hyperlipidemia  10-year cardiovascular risk of approximately 8% currently.  Dietary and exercise modifications as noted above for weight goal less than 260 pounds initially.  Recheck lipid cascade at follow-up in 3 months.       The following high BMI interventions were performed this visit: encouragement to exercise, weight monitoring, weight loss from baseline weight and lifestyle education regarding diet .  Ensure ongoing efforts to achieve weight goal < 270 pounds initially, < 260 pounds ideally.         Subjective:    Mike Vieyra is seen today for follow-up evaluation.  Has had elevated blood pressure previously noted with preoperative exam blood pressures ranging 146 up to 158/.  Not checking blood pressures outside of clinic on a consistent basis.  Did have umbilical hernia repair July 26, 2019.  Also had intra-articular right hip bone marrow aspirate concentrate injection with PRP around that time.  Moderate right versus mild left hip osteoarthritis.  Persistent concerns with hip pain and difficulty with limping.  Works out on a regular basis and feels that strength is perhaps 60% of baseline.  Does have history of hyperlipidemia.  Nonfasting currently.  Comprehensive review of systems as above otherwise all negative.     - Lita  1 daughter -  G[place]na  Tobacco: none  EtOH: 12 beer/week  Mom - breast CA (survivor); cerebral aneurysm with ST memory loss  Dad - h/o alcoholism  1 younger bro -   2 younger sis -   Surgeries: LASIK  Hospitalizations: none  Work:  ([employer])  Hobbies: body building ([email or web address]); theme [employer]    Past Surgical History:   Procedure Laterality Date     COLONOSCOPY  2016     LASIK       IA REPAIR UMBILICAL ALEXANDRA,5+Y/O,REDUC N/A 7/26/2019    Procedure: REPAIR, HERNIA, UMBILICAL, OPEN;  Surgeon: Sheldon Wallace MD;  Location: [facility];  Service: General        Family History   Problem Relation Age of Onset     Aneurysm Mother      Breast cancer Mother      Alcohol abuse Father      Liver disease Father      Heart disease Neg Hx      Colon cancer Neg Hx         Past Medical History:   Diagnosis Date     Diverticular disease 12/16/2016        Social History     Tobacco Use     Smoking status: Never Smoker     Smokeless tobacco: Never Used   Substance Use Topics     Alcohol use: Yes     Alcohol/week: 5.0 - 6.0 standard drinks     Types: 5 - 6 Cans of beer per week     Drug use: No        Current Outpatient Medications   Medication Sig Dispense Refill     multivitamin with minerals (THERA-M) 9 mg iron-400 mcg Tab tablet Take 1 tablet by mouth daily.       losartan (COZAAR) 50 MG tablet Take 1 tablet (50 mg total) by mouth daily. 90 tablet 1     No current facility-administered medications for this visit.           Objective:    Vitals:    10/31/19 1256 10/31/19 1316   BP: 148/82 (!) 142/96   Patient Site: Left Arm    Patient Position: Sitting    Cuff Size: Adult Large    Pulse: 62    Weight: (!) 279 lb 6.4 oz (126.7 kg)       Body mass index is 37.89 kg/m .    Alert.  No apparent distress.  Does transfer somewhat slowly due to right hip pain and does ambulate with slight limp after first standing.  Blood pressure on recheck 142/96.  Chest clear.  Cardiac exam regular.      This note has been dictated  using voice recognition software and as a result may contain minor grammatical errors and unintended word substitutions.

## 2021-06-03 VITALS — WEIGHT: 277 LBS | BODY MASS INDEX: 37.52 KG/M2 | HEIGHT: 72 IN

## 2021-06-03 VITALS — WEIGHT: 269 LBS | HEIGHT: 72 IN | BODY MASS INDEX: 36.44 KG/M2

## 2021-06-03 VITALS — HEIGHT: 73 IN | WEIGHT: 278 LBS | BODY MASS INDEX: 36.84 KG/M2

## 2021-06-03 VITALS
HEART RATE: 62 BPM | DIASTOLIC BLOOD PRESSURE: 96 MMHG | BODY MASS INDEX: 37.89 KG/M2 | SYSTOLIC BLOOD PRESSURE: 142 MMHG | WEIGHT: 279.4 LBS

## 2021-06-03 VITALS — HEIGHT: 73 IN | WEIGHT: 279.9 LBS | BODY MASS INDEX: 37.1 KG/M2

## 2021-06-03 VITALS — HEIGHT: 72 IN | WEIGHT: 270 LBS | BODY MASS INDEX: 36.57 KG/M2

## 2021-06-04 VITALS
BODY MASS INDEX: 38.06 KG/M2 | HEART RATE: 72 BPM | DIASTOLIC BLOOD PRESSURE: 96 MMHG | OXYGEN SATURATION: 96 % | SYSTOLIC BLOOD PRESSURE: 154 MMHG | WEIGHT: 281 LBS | HEIGHT: 72 IN

## 2021-06-06 NOTE — PROGRESS NOTES
Preoperative Exam    Scheduled Procedure: right hip replacement  Surgery Date:  3/5/20  Surgery Location: [facility] - [phone]    Surgeon:  Dr. Jarquin    Assessment/Plan:     1. Preoperative examination  Preoperative examination completed for scheduled right total hip arthroplasty March 5, 2020.  No contraindication to surgery identified.  Electrocardiogram normal.  - Electrocardiogram Perform and Read  - HM2(CBC w/o Differential)    2. Primary osteoarthritis of right hip  Right hip primary osteoarthritis with scheduled total hip arthroplasty as noted above.    3. Essential hypertension, benign  Hypertension suboptimal control blood pressure 154/96 on recheck.  Switch from losartan 50 mg and begin losartan hydrochlorothiazide 100/25 use 1 tablet daily.  Blood pressure recheck in office in 3 months.  Electrocardiogram normal without evidence for LVH, left axis deviation etc.  - Basic Metabolic Panel  - losartan-hydrochlorothiazide (HYZAAR) 100-25 mg per tablet; Take 1 tablet by mouth daily.  Dispense: 90 tablet; Refill: 1    4. Hyperlipidemia LDL goal <100  History of hyperlipidemia.  Patient excited to resume cardiovascular activities for weight loss once surgery completed and will reassess lipid cascade results in next 3 to 6 months while fasting.    5. Snoring  Snoring history without history of obstructive sleep apnea.  Denies any apneic episodes.        Surgical Procedure Risk: Vascular/High (reported cardiac risk often > 5%)  Have you had prior anesthesia?: Yes  Have you or any family members had a previous anesthesia reaction:  No  Do you or any family members have a history of a clotting or bleeding disorder?: No  Cardiac Risk Assessment: no increased risk for major cardiac complications    Pending review of diagnostic evaluation, APPROVAL GIVEN to proceed with proposed procedure, without further diagnostic evaluation.    Please Note:  Patient has an implanted device.  Device Type: bottom left  artificial titanium tooth      Device Vendor:       Functional Status: Independent  Patient plans to recover at home with family.     Subjective:      Mike Vieyra is a 54 y.o. male who presents for a preoperative consultation.  Worsening right hip pain.  Limits him significantly from physical activity.  Still trying to lift weights however cannot participate in cardiovascular activities in general.  Was diagnosed with hypertension October 31, 2019 with blood pressure 142/96 at that time.  Started on losartan 50 mg daily.  Tolerating well.  Not checking blood pressures on his own.  Does snore however no witnessed apneic episodes.  No significant daytime somnolence.  Has had mild hyperlipidemia diet controlled.  2 pound weight gain since prior visit October 31, 2019.  Denies recent illness.  Using diclofenac for hip pain however discontinued on February 22, 2020 prior to scheduled surgery.    All other systems reviewed and are negative, other than those listed in the HPI.     - Lita  1 daughter - Glenna  Tobacco: none  EtOH: 12 beer/week  Mom - breast CA (survivor); cerebral aneurysm with ST memory loss  Dad - h/o alcoholism  1 younger bro -   2 younger sis -   Surgeries: LASIK  Hospitalizations: none  Work:  ([employer])  Hobbies: body building ([employer]); theme [employer], [employer]    Pertinent History  Do you have difficulty breathing or chest pain after walking up a flight of stairs: No  History of obstructive sleep apnea: No  Steroid use in the last 6 months: No  Frequent Aspirin/NSAID use: Yes: diclofenac (last dose 2/22/20)  Prior Blood Transfusion: No  Prior Blood Transfusion Reaction: No  If for some reason prior to, during or after the procedure, if it is medically indicated, would you be willing to have a blood transfusion?:  There is no transfusion refusal.    Current Outpatient Medications   Medication Sig Dispense Refill     losartan (COZAAR) 50 MG tablet Take 1 tablet (50  mg total) by mouth daily. 90 tablet 1     multivitamin with minerals (THERA-M) 9 mg iron-400 mcg Tab tablet Take 1 tablet by mouth daily.       losartan-hydrochlorothiazide (HYZAAR) 100-25 mg per tablet Take 1 tablet by mouth daily. 90 tablet 1     No current facility-administered medications for this visit.         Allergies   Allergen Reactions     Penicillins Rash       Patient Active Problem List   Diagnosis     Diverticular disease     Diverticulosis of large intestine without hemorrhage     Alcohol abuse     Umbilical hernia without obstruction and without gangrene     Hip pain, right     Class 2 obesity due to excess calories without serious comorbidity with body mass index (BMI) of 36.0 to 36.9 in adult     Umbilical hernia       Past Medical History:   Diagnosis Date     Diverticular disease 12/16/2016       Past Surgical History:   Procedure Laterality Date     COLONOSCOPY  2016     LASIK       ID REPAIR UMBILICAL ALEXANDRA,5+Y/O,REDUC N/A 7/26/2019    Procedure: REPAIR, HERNIA, UMBILICAL, OPEN;  Surgeon: Sheldon Wallace MD;  [facility]: [facility];  Service: General       Social History     Socioeconomic History     Marital status:      Spouse name: Not on file     Number of children: Not on file     Years of education: Not on file     Highest education level: Not on file   Occupational History     Not on file   Social Needs     Financial resource strain: Not on file     Food insecurity:     Worry: Not on file     Inability: Not on file     Transportation needs:     Medical: Not on file     Non-medical: Not on file   Tobacco Use     Smoking status: Never Smoker     Smokeless tobacco: Never Used   Substance and Sexual Activity     Alcohol use: Yes     Alcohol/week: 5.0 - 6.0 standard drinks     Types: 5 - 6 Cans of beer per week     Drug use: No     Sexual activity: Not on file   Lifestyle     Physical activity:     Days per week: Not on file     Minutes per session: Not on file     Stress: Not  "on file   Relationships     Social connections:     Talks on phone: Not on file     Gets together: Not on file     Attends Rastafarian service: Not on file     Active member of club or organization: Not on file     Attends meetings of clubs or organizations: Not on file     Relationship status: Not on file     Intimate partner violence:     Fear of current or ex partner: Not on file     Emotionally abused: Not on file     Physically abused: Not on file     Forced sexual activity: Not on file   Other Topics Concern     Not on file   Social History Narrative     Not on file       Patient Care Team:  Seth Short MD as PCP - General (Family Medicine)  Seth Short MD as Assigned PCP          Objective:     Vitals:    02/26/20 1116 02/26/20 1120 02/26/20 1215   BP: (!) 160/100 (!) 160/100 (!) 154/96   Pulse: 72     SpO2: 96%     Weight: (!) 281 lb (127.5 kg)     Height: 5' 11.75\" (1.822 m)           Physical Exam:  Physical Exam     General Appearance:    Alert, cooperative, no distress, appears stated age.  Slow transfers.  Ambulates with significant limp due to hip pain.   Head:    Normocephalic, without obvious abnormality, atraumatic   Eyes:    PERRL, conjunctiva/corneas clear, EOM's intact, fundi     benign, both eyes        Ears:    Normal TM's and external ear canals, both ears   Nose:   Nares normal, septum midline, mucosa normal, no drainage    or sinus tenderness   Throat:   Lips, mucosa, and tongue normal; teeth and gums normal   Neck:   Supple, symmetrical, trachea midline, no adenopathy;        thyroid:  No enlargement/tenderness/nodules; no carotid    bruit or JVD   Back:     Symmetric, no curvature, ROM normal, no CVA tenderness   Lungs:     Clear to auscultation bilaterally, respirations unlabored   Chest wall:    No tenderness or deformity   Heart:    Regular rate and rhythm, S1 and S2 normal, no murmur, rub   or gallop   Abdomen:     Soft, non-tender, bowel sounds active all four quadrants,     " no masses, no organomegaly.     Genitalia:    deferred   Rectal:    deferred   Extremities:   Extremities normal, atraumatic, no cyanosis or edema.  Significant limitation associated with right greater than left hip osteoarthritis with slow transition and ambulating with a limp.   Pulses:   2+ and symmetric all extremities   Skin:   Skin color, texture, turgor normal, no rashes or lesions   Lymph nodes:   Cervical, supraclavicular, and axillary nodes normal   Neurologic:   CNII-XII intact. Normal strength, sensation and reflexes       throughout        There are no Patient Instructions on file for this visit.    EKG:  NSR with normal EKG.  V-rate 67 bpm    Labs:  Recent Results (from the past 24 hour(s))   Electrocardiogram Perform and Read    Collection Time: 02/26/20 11:25 AM   Result Value Ref Range    SYSTOLIC BLOOD PRESSURE      DIASTOLIC BLOOD PRESSURE      VENTRICULAR RATE 67 BPM    ATRIAL RATE 67 BPM    P-R INTERVAL 148 ms    QRS DURATION 86 ms    Q-T INTERVAL 402 ms    QTC CALCULATION (BEZET) 424 ms    P Axis 50 degrees    R AXIS 32 degrees    T AXIS 21 degrees    MUSE DIAGNOSIS       Normal sinus rhythm with sinus arrhythmia  Normal ECG  When compared with ECG of 16-JUL-2019 09:43,  No significant change was found         Immunization History   Administered Date(s) Administered     Tdap 08/30/2015           Electronically signed by Seth Short MD 02/26/20 11:18 AM

## 2021-06-08 NOTE — PROGRESS NOTES
"Mike Vieyra is a 54 y.o. male who is being evaluated via a billable video visit.      The patient has been notified of following:     \"This video visit will be conducted via a call between you and your physician/provider. We have found that certain health care needs can be provided without the need for an in-person physical exam.  This service lets us provide the care you need with a video conversation.  If a prescription is necessary we can send it directly to your pharmacy.  If lab work is needed we can place an order for that and you can then stop by our lab to have the test done at a later time.    Video visits are billed at different rates depending on your insurance coverage. Please reach out to your insurance provider with any questions.    If during the course of the call the physician/provider feels a video visit is not appropriate, you will not be charged for this service.\"    Patient has given verbal consent to a Video visit? Yes    Patient would like to receive their AVS by AVS Preference: Mail a copy.    Patient would like the video invitation sent by: Text to cell phone: [phone] (M)    Will anyone else be joining your video visit? No         - Lita  1 daughter - Glenna  Tobacco: none  EtOH: 12 beer/week  Mom - breast CA (survivor); cerebral aneurysm with ST memory loss  Dad - h/o alcoholism  1 younger bro -   2 younger sis -   Surgeries: LASIK  Hospitalizations: none  Work:  ([employer])  Hobbies: body building ([employer]); [employer], [employer]    Video Start Time: 9:45 AM    Additional provider notes: GENERAL: Healthy, alert and no distress  EYES: Eyes grossly normal to inspection. No discharge or erythema, or obvious scleral/conjunctival abnormalities.  RESP: No audible wheeze, cough, or visible cyanosis.  No visible retractions or increased work of breathing.    NEURO: Cranial nerves grossly intact. Mentation and speech appropriate for age.  PSYCH: Mentation " "appears normal, affect normal/bright, judgement and insight intact, normal speech and appearance well-groomed    Video visit completed today.  Patient with history of hypertension.  Had preop clearance February 26, 2020 for a subsequent right total hip arthroplasty completed March 5, 2020.  Blood pressure elevation 154/96 on losartan 50 mg daily therefore switched to losartan hydrochlorothiazide 100/25 daily.  Patient states he feels \"dry\" in regard to his muscles with beef jerky sensation and some increased cramping perhaps.  Has not been checking blood pressures outside the clinic.  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  Hopes to get back into the gym where he performs bodybuilding activities.  History of hyperlipidemia.  Have discussed weight goal less than 270 pounds initially, less than 260 pounds ideally cholesterol management.  Prior TSH 1.48 July 16, 2019.  Basic metabolic panel was normal along with CBC on February 26, 2020.  Denies recent illness.  Comprehensive review of systems as above otherwise all negative.    1. Essential hypertension, benign  Due to side effects of patient describing \"dry muscles\" and some increased cramping, will start losartan hydrochlorothiazide 100/25 daily and instead use losartan 100 mg daily.  Recheck in office in 3 months.  Fasting labs to be completed at that time with med monitoring including electrolytes, magnesium, renal function while on losartan.  - losartan (COZAAR) 100 MG tablet; Take 1 tablet (100 mg total) by mouth daily.  Dispense: 90 tablet; Refill: 1    2. Hyperlipidemia LDL goal <100  Hyperlipidemia.  Dietary and exercise modification for weight goal less than 270 pounds initially, less than 260 pounds ideally.  Check lipid cascade at follow-up exam in 3 months.    3. Primary osteoarthritis of right hip  Status post right total hip arthroplasty March 5, 2020 which is gone well.  Continues to recover appropriately and increasing activity as " tolerated.           Video-Visit Details    Type of service:  Video Visit    Video End Time (time video stopped): 9:55 AM  Originating Location (pt. Location): Home    Distant Location (provider location):  [facility] MEDICINE/OB     Platform used for Video Visit: Colleen Short MD

## 2021-06-08 NOTE — PROGRESS NOTES
Assessment/Plan:     1. Sinusitis  Supportive care and over-the-counter medication recommended.  Will try nasal spray and alternative antibiotic.  Patient has allergy to penicillins causing hives.  May consider steroids if symptoms worsen or do not improve.  Call return to care.  - fluticasone (FLONASE) 50 mcg/actuation nasal spray; 1 spray into each nostril daily.  Dispense: 16 g; Refill: 2  - doxycycline (VIBRA-TABS) 100 MG tablet; Take 1 tablet (100 mg total) by mouth 2 (two) times a day for 10 days.  Dispense: 20 tablet; Refill: 0        Subjective:      Mike Vieyra is a 51 y.o. male comes in today with another respiratory tract infection.  It has been about a month months since we treated him for bronchitis.  At that time he had been sick for about 3 weeks.  He states that when he took Zithromax he did get good relief in his symptoms decreased but they only seem to go away for about a week or so.  He states that this time the symptoms are not so much in his lungs but more in the sinus facial area he feels pain and pressure he feels pressure in the ears more so on the left.  He feels significant congestion and some postnasal drip.  He does have the occasional cough but it is much better than before no loss of voice and no shortness of breath.  He has not had any fevers.  He has been using Mucinex severe sinus decongestant medication which is helping somewhat.  He does state that he had a night where he had some warm sweats.  Otherwise he feels well has no other new concerns.    Current Outpatient Prescriptions   Medication Sig Dispense Refill     doxycycline (VIBRA-TABS) 100 MG tablet Take 1 tablet (100 mg total) by mouth 2 (two) times a day for 10 days. 20 tablet 0     fluticasone (FLONASE) 50 mcg/actuation nasal spray 1 spray into each nostril daily. 16 g 2     No current facility-administered medications for this visit.        Past Medical History, Family History, and Social History reviewed.  Past  Medical History   Diagnosis Date     Diverticular disease 12/16/2016     Past Surgical History   Procedure Laterality Date     Lasik       Penicillins  Family History   Problem Relation Age of Onset     Aneurysm Mother      Breast cancer Mother      Alcohol abuse Father      Liver disease Father      Heart disease Neg Hx      Colon cancer Neg Hx      Social History     Social History     Marital status:      Spouse name: N/A     Number of children: N/A     Years of education: N/A     Occupational History     Not on file.     Social History Main Topics     Smoking status: Never Smoker     Smokeless tobacco: Not on file     Alcohol use 3.0 - 3.6 oz/week     5 - 6 Cans of beer per week     Drug use: No     Sexual activity: Not on file     Other Topics Concern     Not on file     Social History Narrative         Review of systems is as stated in HPI, and the remainder of the 10 system review is otherwise negative.    Objective:     Vitals:    01/24/17 0903   BP: 132/84   Pulse: 88   Temp: 98.5  F (36.9  C)   TempSrc: Oral   Weight: (!) 277 lb (125.6 kg)   Height: 6' (1.829 m)    Body mass index is 37.57 kg/(m^2).    General Appearance:    Alert, cooperative, no distress, appears stated age   Head:    Normocephalic, without obvious abnormality, atraumatic   Eyes:    PERRL, EOM's intact   Ears:   left TM has clear fluid bulge, otherwise normal on the right and external ear canals   Nose:   Mucosa normal, no drainage   Audible congestion, mild sinus tenderness    Throat:   Oropharynx is clear   Neck:   Supple, symmetrical, no adenopathy, no thyromegally       Lungs:     Clear to auscultation bilaterally, respirations unlabored        Heart:    Regular rate and rhythm, S1 and S2 normal, no murmur, rub    or gallop       Abdomen:     Soft, non-tender, bowel sounds active all four quadrants,     no masses, no organomegaly           Extremities:   Extremities normal, atraumatic, no cyanosis or edema       Skin:   No  rashes or lesions         This note has been dictated using voice recognition software. Any grammatical or context distortions are unintentional and inherent to the the software.

## 2021-06-12 ENCOUNTER — HEALTH MAINTENANCE LETTER (OUTPATIENT)
Age: 56
End: 2021-06-12

## 2021-06-16 PROBLEM — E66.812 CLASS 2 OBESITY DUE TO EXCESS CALORIES WITHOUT SERIOUS COMORBIDITY WITH BODY MASS INDEX (BMI) OF 36.0 TO 36.9 IN ADULT: Status: ACTIVE | Noted: 2019-05-24

## 2021-06-16 PROBLEM — K42.9 UMBILICAL HERNIA WITHOUT OBSTRUCTION AND WITHOUT GANGRENE: Status: ACTIVE | Noted: 2019-05-24

## 2021-06-16 PROBLEM — K42.9 UMBILICAL HERNIA: Status: ACTIVE | Noted: 2019-06-11

## 2021-06-16 PROBLEM — K57.30 DIVERTICULOSIS OF LARGE INTESTINE WITHOUT HEMORRHAGE: Status: ACTIVE | Noted: 2019-05-24

## 2021-06-16 PROBLEM — F10.10 ALCOHOL ABUSE: Status: ACTIVE | Noted: 2019-05-24

## 2021-06-16 PROBLEM — E66.09 CLASS 2 OBESITY DUE TO EXCESS CALORIES WITHOUT SERIOUS COMORBIDITY WITH BODY MASS INDEX (BMI) OF 36.0 TO 36.9 IN ADULT: Status: ACTIVE | Noted: 2019-05-24

## 2021-06-16 PROBLEM — M25.551 HIP PAIN, RIGHT: Status: ACTIVE | Noted: 2019-05-24

## 2021-06-19 NOTE — LETTER
Letter by Clara Vargas CNP at      Author: Clara Vargas CNP Service: -- Author Type: --    Filed:  Encounter Date: 8/8/2019 Status: (Other)         August 8, 2019     Patient: Mike Vieyra   YOB: 1965   Date of Visit: 8/8/2019       To Whom It May Concern:    It is my medical opinion that Mike Vieyra may return to full duty immediately with no restrictions.    If you have any questions or concerns, please don't hesitate to call.    Sincerely,        Electronically signed by Clara Vagras CNP

## 2021-09-16 ENCOUNTER — TRANSFERRED RECORDS (OUTPATIENT)
Dept: HEALTH INFORMATION MANAGEMENT | Facility: CLINIC | Age: 56
End: 2021-09-16

## 2021-10-02 ENCOUNTER — HEALTH MAINTENANCE LETTER (OUTPATIENT)
Age: 56
End: 2021-10-02

## 2021-10-28 ENCOUNTER — OFFICE VISIT (OUTPATIENT)
Dept: FAMILY MEDICINE | Facility: CLINIC | Age: 56
End: 2021-10-28
Payer: COMMERCIAL

## 2021-10-28 VITALS
DIASTOLIC BLOOD PRESSURE: 84 MMHG | HEART RATE: 78 BPM | HEIGHT: 72 IN | OXYGEN SATURATION: 96 % | BODY MASS INDEX: 38.33 KG/M2 | WEIGHT: 283 LBS | SYSTOLIC BLOOD PRESSURE: 142 MMHG

## 2021-10-28 DIAGNOSIS — E55.9 VITAMIN D INSUFFICIENCY: ICD-10-CM

## 2021-10-28 DIAGNOSIS — M16.12 PRIMARY OSTEOARTHRITIS OF LEFT HIP: ICD-10-CM

## 2021-10-28 DIAGNOSIS — Z01.818 PREOPERATIVE EXAMINATION: Primary | ICD-10-CM

## 2021-10-28 DIAGNOSIS — I10 ESSENTIAL HYPERTENSION, BENIGN: ICD-10-CM

## 2021-10-28 DIAGNOSIS — E78.5 HYPERLIPIDEMIA LDL GOAL <100: ICD-10-CM

## 2021-10-28 PROBLEM — M25.551 HIP PAIN, RIGHT: Status: RESOLVED | Noted: 2019-05-24 | Resolved: 2021-10-28

## 2021-10-28 PROBLEM — K42.9 UMBILICAL HERNIA: Status: RESOLVED | Noted: 2019-06-11 | Resolved: 2021-10-28

## 2021-10-28 PROBLEM — K42.9 UMBILICAL HERNIA WITHOUT OBSTRUCTION AND WITHOUT GANGRENE: Status: RESOLVED | Noted: 2019-05-24 | Resolved: 2021-10-28

## 2021-10-28 LAB
ANION GAP SERPL CALCULATED.3IONS-SCNC: 13 MMOL/L (ref 5–18)
BUN SERPL-MCNC: 18 MG/DL (ref 8–22)
CALCIUM SERPL-MCNC: 9.6 MG/DL (ref 8.5–10.5)
CHLORIDE BLD-SCNC: 104 MMOL/L (ref 98–107)
CO2 SERPL-SCNC: 24 MMOL/L (ref 22–31)
CREAT SERPL-MCNC: 1.33 MG/DL (ref 0.7–1.3)
ERYTHROCYTE [DISTWIDTH] IN BLOOD BY AUTOMATED COUNT: 12.3 % (ref 10–15)
GFR SERPL CREATININE-BSD FRML MDRD: 60 ML/MIN/1.73M2
GLUCOSE BLD-MCNC: 100 MG/DL (ref 70–125)
HCT VFR BLD AUTO: 41.1 % (ref 40–53)
HGB BLD-MCNC: 14.2 G/DL (ref 13.3–17.7)
MCH RBC QN AUTO: 30.8 PG (ref 26.5–33)
MCHC RBC AUTO-ENTMCNC: 34.5 G/DL (ref 31.5–36.5)
MCV RBC AUTO: 89 FL (ref 78–100)
PLATELET # BLD AUTO: 235 10E3/UL (ref 150–450)
POTASSIUM BLD-SCNC: 4.3 MMOL/L (ref 3.5–5)
RBC # BLD AUTO: 4.61 10E6/UL (ref 4.4–5.9)
SODIUM SERPL-SCNC: 141 MMOL/L (ref 136–145)
WBC # BLD AUTO: 5.9 10E3/UL (ref 4–11)

## 2021-10-28 PROCEDURE — 85027 COMPLETE CBC AUTOMATED: CPT | Performed by: FAMILY MEDICINE

## 2021-10-28 PROCEDURE — 82306 VITAMIN D 25 HYDROXY: CPT | Performed by: FAMILY MEDICINE

## 2021-10-28 PROCEDURE — 80048 BASIC METABOLIC PNL TOTAL CA: CPT | Performed by: FAMILY MEDICINE

## 2021-10-28 PROCEDURE — 99214 OFFICE O/P EST MOD 30 MIN: CPT | Performed by: FAMILY MEDICINE

## 2021-10-28 PROCEDURE — 36415 COLL VENOUS BLD VENIPUNCTURE: CPT | Performed by: FAMILY MEDICINE

## 2021-10-28 RX ORDER — LOSARTAN POTASSIUM 100 MG/1
100 TABLET ORAL DAILY
COMMUNITY
Start: 2021-09-05 | End: 2022-03-03

## 2021-10-28 ASSESSMENT — MIFFLIN-ST. JEOR: SCORE: 2152.71

## 2021-10-28 NOTE — PROGRESS NOTES
[facility]  [address] [place] [address] [address]VE [facility] [address]  [facility] [address]  Phone: [phone]  Fax: [phone]  Primary Provider: Seth Short        PREOPERATIVE EVALUATION:  Today's date: 10/28/2021    Mike Vieyra is a 56 year old male who presents for a preoperative evaluation.    Surgical Information:  Surgery/Procedure: left hip  Surgery Location: [facility]  Surgeon: Dr. Chandler  Surgery Date: 11/4/21   Time of Surgery: TBD  Where patient plans to recover: At home with family  Fax number for surgical facility: [phone]    Type of Anesthesia Anticipated: to be determined    Preoperative examination  Preoperative examination completed.  Preoperative labs including CBC and basic metabolic panel were obtained per protocol.  Asymptomatic COVID-19 PCR testing to be completed within 72 hours prior to scheduled procedure.  Paperwork to be forwarded to [facility] at [facility] [phone].  - CBC with platelets  - Asymptomatic COVID-19 Virus (Coronavirus) by PCR    Primary osteoarthritis of left hip  Primary osteoarthritis of left hip.  Scheduled left total hip arthroplasty noted.  Has done well historically with prior right total hip arthroplasty March 5, 2020 without residual concerns.    Essential hypertension, benign  Hypertension, fair control.  Had switch from losartan hydrochlorothiazide instead to losartan after prior concerns with impact on muscle.  Using 100 mg tablet daily without side effects with recent blood pressure 128/82 reported at dentist office.  Blood pressure on recheck today 128/86 and will continue current medicine with base metabolic panel for med monitoring.  - Basic metabolic panel    Hyperlipidemia LDL goal <100  History of mild hyperlipidemia.  Nonfasting today.  Continue attempts at weight goal less than 270 pounds initially, less than 260 pounds ideally.    Vitamin D insufficiency  States using vitamin D 5000  units taken 2 tablets daily.  Check vitamin D level to ensure adequate replacement without evidence for vitamin D insufficiency, vitamin D toxicity, etc.  - Vitamin D Deficiency           Subjective     HPI related to upcoming procedure: Patient seen today for preoperative examination for upcoming left total hip arthroplasty procedure November 4, 2021 through [facility] with Dr. Chandler.  History of osteoarthritis bilateral hip status post right total hip arthroplasty March 5, 2020 with excellent results.  Now scheduled for left total hip arthroplasty November 4, 2021.  Does have underlying history of hypertension.  Utilizing losartan 100 mg daily.  No recent illness.  No chest pain.  Completed Pfizer COVID-19 vaccine series May 2021.  History of hyperlipidemia reviewed.  Describes himself as being out of shape.  Does typically significant workout associated bodybuilding activities etc.  States utilizing vitamin D 5000 units taken 2 tablets once daily.  H/O 12 beer/week.  Denies concerns for alcohol consumption currently.  Comprehensive review of systems as above otherwise all negative.       - Lita  1 daughter - Glenna  Tobacco: none  EtOH: 12 beer/week  Mom - breast CA (survivor); cerebral aneurysm with ST memory loss  Dad - h/o alcoholism  1 younger bro -   2 younger sis -   Surgeries: LASIK; umbilical hernia 7/26/19; right SHAAN 3/5/20; scheduled left SHAAN 11/4/21  Hospitalizations: none  Work:  ([employer])  Hobbies: body building ([employer]); theme [employer], [employer]      Preop Questions 10/27/2021   1. Have you ever had a heart attack or stroke? No   2. Have you ever had surgery on your heart or blood vessels, such as a stent placement, a coronary artery bypass, or surgery on an artery in your head, neck, heart, or legs? No   3. Do you have chest pain with activity? No   4. Do you have a history of  heart failure? No   5. Do you currently have a  cold, bronchitis or symptoms of other infection? No   6. Do you have a cough, shortness of breath, or wheezing? No   7. Do you or anyone in your family have previous history of blood clots? No   8. Do you or does anyone in your family have a serious bleeding problem such as prolonged bleeding following surgeries or cuts? No   9. Have you ever had problems with anemia or been told to take iron pills? No   10. Have you had any abnormal blood loss such as black, tarry or bloody stools? No   11. Have you ever had a blood transfusion? No   12. Are you willing to have a blood transfusion if it is medically needed before, during, or after your surgery? NO -     13. Have you or any of your relatives ever had problems with anesthesia? No   14. Do you have sleep apnea, excessive snoring or daytime drowsiness? No   15. Do you have any artifical heart valves or other implanted medical devices like a pacemaker, defibrillator, or continuous glucose monitor? No   16. Do you have artificial joints? YES - h/o right SHAAN   17. Are you allergic to latex? No       Health Care Directive:  Patient does not have a Health Care Directive or Living Will: Discussed advance care planning with patient; however, patient declined at this time.    Preoperative Review of :   reviewed - no record of controlled substances prescribed.      Status of Chronic Conditions:  See problem list for active medical problems.  Problems all longstanding and stable, except as noted/documented.  See ROS for pertinent symptoms related to these conditions.    HYPERTENSION - Patient has longstanding history of HTN , currently denies any symptoms referable to elevated blood pressure. Specifically denies chest pain, palpitations, dyspnea, orthopnea, PND or peripheral edema. Blood pressure readings have been in normal range. Current medication regimen is as listed below. Patient denies any side effects of medication.       Review of Systems  CONSTITUTIONAL: NEGATIVE  for fever, chills, change in weight  INTEGUMENTARY/SKIN: NEGATIVE for worrisome rashes, moles or lesions  EYES: NEGATIVE for vision changes or irritation  ENT/MOUTH: NEGATIVE for ear, mouth and throat problems  RESP: NEGATIVE for significant cough or SOB  CV: NEGATIVE for chest pain, palpitations or peripheral edema  GI: NEGATIVE for nausea, abdominal pain, heartburn, or change in bowel habits  : NEGATIVE for frequency, dysuria, or hematuria  MUSCULOSKELETAL: NEGATIVE for significant arthralgias or myalgia  NEURO: NEGATIVE for weakness, dizziness or paresthesias  ENDOCRINE: NEGATIVE for temperature intolerance, skin/hair changes  HEME: NEGATIVE for bleeding problems  PSYCHIATRIC: NEGATIVE for changes in mood or affect    Patient Active Problem List    Diagnosis Date Noted     Diverticulosis of large intestine without hemorrhage 05/24/2019     Priority: Medium     Alcohol abuse 05/24/2019     Priority: Medium     Class 2 obesity due to excess calories without serious comorbidity with body mass index (BMI) of 36.0 to 36.9 in adult 05/24/2019     Priority: Medium      Past Medical History:   Diagnosis Date     Diverticular disease 12/16/2016     Past Surgical History:   Procedure Laterality Date     COLONOSCOPY  2016     HC REPAIR UMBILICAL ALEXANDRA,5+Y/O,REDUC N/A 7/26/2019    Procedure: REPAIR, HERNIA, UMBILICAL, OPEN;  Surgeon: Sheldon Wallace MD;  Location: [facility];  Service: [facility]       Current Outpatient Medications   Medication Sig Dispense Refill     losartan (COZAAR) 100 MG tablet Take 100 mg by mouth daily         Allergies   Allergen Reactions     Penicillins      Discoloration to skin - orange - and itching. No cough. No shortness of breath.         Social History     Tobacco Use     Smoking status: Never Smoker     Smokeless tobacco: Never Used   Substance Use Topics     Alcohol use: Yes     Alcohol/week: 5.0 - 6.0 standard drinks     Comment: occasional      Family History   Problem  "Relation Age of Onset     Cancer Mother         breast cancer     Allergies Mother      Allergies Brother      Allergies Sister      Allergies Daughter      Allergies Sister      Aneurysm Mother      Breast Cancer Mother      Alcoholism Father      Liver Disease Father      Heart Disease No family hx of      Colon Cancer No family hx of      History   Drug Use No         Objective     BP (!) 142/84   Pulse 78   Ht 1.822 m (5' 11.75\")   Wt 128.4 kg (283 lb)   SpO2 96%   BMI 38.65 kg/m      Physical Exam    GENERAL APPEARANCE: healthy, alert and no distress     EYES: EOMI,  PERRL     HENT: ear canals and TM's normal and nose and mouth without ulcers or lesions     NECK: no adenopathy, no asymmetry, masses, or scars and thyroid normal to palpation     RESP: lungs clear to auscultation - no rales, rhonchi or wheezes     CV: regular rates and rhythm, normal S1 S2, no S3 or S4 and no murmur, click or rub     ABDOMEN:  soft, nontender, no HSM or masses and bowel sounds normal     MS: extremities normal- no gross deformities noted, no evidence of inflammation in joints, FROM in all extremities.     SKIN: no suspicious lesions or rashes     NEURO: Normal strength and tone, sensory exam grossly normal, mentation intact and speech normal     PSYCH: mentation appears normal. and affect normal/bright     LYMPHATICS: No cervical adenopathy    Recent Labs   Lab Test 02/26/20  1211 02/26/20  1209   HGB  --  14.7   PLT  --  229     --    POTASSIUM 4.6  --    CR 1.23  --         Diagnostics:  Labs pending at this time.  Results will be reviewed when available.  No results found for this or any previous visit (from the past 24 hour(s)).   No EKG required, no history of coronary heart disease, significant arrhythmia, peripheral arterial disease or other structural heart disease.    EKG 2/6/20:  Sinus rhythm with sinus arrhythmia   Normal ECG   When compared with ECG of 16-JUL-2019 09:43,   No significant change was found "   Confirmed by SAM DE SOUZA MD LOC:[facility] ([id number]) on 2/27/2020 12:44:04 PM       Revised Cardiac Risk Index (RCRI):  The patient has the following serious cardiovascular risks for perioperative complications:   - High risk surgery (>5% cardiac complication risk) = 1 point     RCRI Interpretation: 1 point: Class II (low risk - 0.9% complication rate)           Signed Electronically by: Seth Short MD  Copy of this evaluation report is provided to requesting physician.

## 2021-10-28 NOTE — PATIENT INSTRUCTIONS

## 2021-10-29 LAB — DEPRECATED CALCIDIOL+CALCIFEROL SERPL-MC: 52 UG/L (ref 30–80)

## 2021-11-01 ENCOUNTER — LAB (OUTPATIENT)
Dept: LAB | Facility: CLINIC | Age: 56
End: 2021-11-01
Payer: COMMERCIAL

## 2021-11-01 DIAGNOSIS — Z01.818 PREOPERATIVE EXAMINATION: ICD-10-CM

## 2021-11-01 PROCEDURE — U0005 INFEC AGEN DETEC AMPLI PROBE: HCPCS

## 2021-11-01 PROCEDURE — U0003 INFECTIOUS AGENT DETECTION BY NUCLEIC ACID (DNA OR RNA); SEVERE ACUTE RESPIRATORY SYNDROME CORONAVIRUS 2 (SARS-COV-2) (CORONAVIRUS DISEASE [COVID-19]), AMPLIFIED PROBE TECHNIQUE, MAKING USE OF HIGH THROUGHPUT TECHNOLOGIES AS DESCRIBED BY CMS-2020-01-R: HCPCS

## 2021-11-02 LAB — SARS-COV-2 RNA RESP QL NAA+PROBE: NEGATIVE

## 2021-11-04 ENCOUNTER — TRANSFERRED RECORDS (OUTPATIENT)
Dept: HEALTH INFORMATION MANAGEMENT | Facility: CLINIC | Age: 56
End: 2021-11-04
Payer: COMMERCIAL

## 2021-11-18 ENCOUNTER — TRANSFERRED RECORDS (OUTPATIENT)
Dept: HEALTH INFORMATION MANAGEMENT | Facility: CLINIC | Age: 56
End: 2021-11-18
Payer: COMMERCIAL

## 2021-12-14 ENCOUNTER — TRANSFERRED RECORDS (OUTPATIENT)
Dept: HEALTH INFORMATION MANAGEMENT | Facility: CLINIC | Age: 56
End: 2021-12-14
Payer: COMMERCIAL

## 2022-03-01 ENCOUNTER — E-VISIT (OUTPATIENT)
Dept: FAMILY MEDICINE | Facility: CLINIC | Age: 57
End: 2022-03-01
Payer: COMMERCIAL

## 2022-03-01 DIAGNOSIS — J01.90 ACUTE NON-RECURRENT SINUSITIS, UNSPECIFIED LOCATION: Primary | ICD-10-CM

## 2022-03-01 DIAGNOSIS — I10 ESSENTIAL (PRIMARY) HYPERTENSION: ICD-10-CM

## 2022-03-01 PROCEDURE — 99421 OL DIG E/M SVC 5-10 MIN: CPT | Performed by: FAMILY MEDICINE

## 2022-03-01 RX ORDER — DOXYCYCLINE HYCLATE 100 MG
100 TABLET ORAL 2 TIMES DAILY
Qty: 14 TABLET | Refills: 0 | Status: SHIPPED | OUTPATIENT
Start: 2022-03-01 | End: 2022-03-08

## 2022-03-01 NOTE — PATIENT INSTRUCTIONS
Sinusitis (Antibiotic Treatment)    The sinuses are air-filled spaces within the bones of the face. They connect to the inside of the nose. Sinusitis is an inflammation of the tissue that lines the sinuses. Sinusitis can occur during a cold. It can also happen due to allergies to pollens and other particles in the air. Sinusitis can cause symptoms of sinus congestion and a feeling of fullness. A sinus infection causes fever, headache, and facial pain. There is often green or yellow fluid draining from the nose or into the back of the throat (post-nasal drip). You have been given antibiotics to treat this condition.   Home care    Take the full course of antibiotics as instructed. Don't stop taking them, even when you feel better.    Drink plenty of water, hot tea, and other liquids as directed by the healthcare provider. This may help thin nasal mucus. It also may help your sinuses drain fluids.    Heat may help soothe painful areas of your face. Use a towel soaked in hot water. Or,  the shower and direct the warm spray onto your face. Using a vaporizer along with a menthol rub at night may also help soothe symptoms.     An expectorant with guaifenesin may help thin nasal mucus and help your sinuses drain fluids. Talk with your provider or pharmacists before taking an over-the-counter (OTC) medicine if you have any questions about it or its side effects..    You can use an OTC decongestant, unless a similar medicine was prescribed to you. Nasal sprays work the fastest. Use one that contains phenylephrine or oxymetazoline. First blow your nose gently. Then use the spray. Don't use these medicines more often than directed on the label. If you do, your symptoms may get worse. You may also take pills that contain pseudoephedrine. Don t use products that combine multiple medicines. This is because side effects may be increased. Read labels. You can also ask the pharmacist for help. (People with high blood  pressure should not use decongestants. They can raise blood pressure.) Talk with your provider or pharmacist if you have any questions about the medicine..    OTC antihistamines may help if allergies contributed to your sinusitis. Talk with your provider or pharmacist if you have any questions about the medicine..    Don't use nasal rinses or irrigation during an acute sinus infection, unless your healthcare provider tells you to. Rinsing may spread the infection to other areas in your sinuses.    Use acetaminophen or ibuprofen to control pain, unless another pain medicine was prescribed to you. If you have chronic liver or kidney disease or ever had a stomach ulcer, talk with your healthcare provider before using these medicines. Never give aspirin to anyone under age 18 who is ill with a fever. It may cause severe liver damage.    Don't smoke. This can make symptoms worse.    Follow-up care  Follow up with your healthcare provider, or as advised.   When to seek medical advice  Call your healthcare provider if any of these occur:     Facial pain or headache that gets worse    Stiff neck    Unusual drowsiness or confusion    Swelling of your forehead or eyelids    Symptoms don't go away in 10 days    Vision problems, such as blurred or double vision    Fever of 100.4 F (38 C) or higher, or as directed by your healthcare provider  Call 911  Call 911 if any of these occur:     Seizure    Trouble breathing    Feeling dizzy or faint    Fingernails, skin or lips look blue, purple , or gray  Prevention  Here are steps you can take to help prevent an infection:     Keep good hand washing habits.    Don t have close contact with people who have sore throats, colds, or other upper respiratory infections.    Don t smoke, and stay away from secondhand smoke.    Stay up to date with of your vaccines.  [facility] [employer] last reviewed this educational content on 12/1/2019 2000-2021 [employer]. All rights reserved. This  information is not intended as a substitute for professional medical care. Always follow your healthcare professional's instructions.        Dear Mike Vieyra    After reviewing your responses, I've been able to diagnose you with?a sinus infection caused by bacteria.?     Based on your responses and diagnosis, I have prescribed doxycycline (antibiotic) to treat your symptoms. I have sent this to your pharmacy.?     It is also important to stay well hydrated, get lots of rest and take over-the-counter decongestants,?tylenol?or ibuprofen if you?are able to?take those medications per your primary care provider to help relieve discomfort.?     It is important that you take?all of?your prescribed medication even if your symptoms are improving after a few doses.? Taking?all of?your medicine helps prevent the symptoms from returning.?     If your symptoms worsen, you develop severe headache, vomiting, high fever (>102), or are not improving in 7 days, please contact your primary care provider for an appointment or visit any of our convenient Walk-in Care or Urgent Care Centers to be seen which can be found on our website?here.?     Thanks again for choosing?us?as your health care partner,?   ?  Seth Short MD?

## 2022-03-03 RX ORDER — LOSARTAN POTASSIUM 100 MG/1
TABLET ORAL
Qty: 30 TABLET | Refills: 5 | Status: SHIPPED | OUTPATIENT
Start: 2022-03-03 | End: 2022-08-21

## 2022-03-03 NOTE — TELEPHONE ENCOUNTER
"  Outpatient Medication Detail     Disp Refills Start End EVELINE   losartan (COZAAR) 100 MG tablet 90 tablet 1 10/14/2020  No   Sig - Route: Take 1 tablet (100 mg total) by mouth daily. - Oral   Sent to pharmacy as: losartan 100 mg tablet (COZAAR)   E-Prescribing Status: Receipt confirmed by pharmacy (10/14/2020  1:05 PM CDT)       losartan (COZAAR) 100 MG tablet [[id number]]    Electronically signed by: Seth Short MD on 10/14/20 1305 Status: Active   Ordering user: Seth Short MD 10/14/20 1305 Authorized by: Seth Short MD   Frequency: DAILY 10/14/20 - Until Discontinued Released by: Seth Short MD 10/14/20 1305   Diagnoses  Essential hypertension, benign [I10]     Routing refill request to provider for review/approval because:  BP not in range.    Last office visit provider:  10/28/21     Requested Prescriptions   Pending Prescriptions Disp Refills     losartan (COZAAR) 100 MG tablet [Pharmacy Med Name: LOSARTAN POTASSIUM 100 MG TAB] 30 tablet 5     Sig: TAKE 1 TABLET BY MOUTH EVERY DAY       Angiotensin-II Receptors Failed - 3/3/2022 12:48 PM        Failed - Last blood pressure under 140/90 in past 12 months     BP Readings from Last 3 Encounters:   10/28/21 (!) 142/84   02/26/20 (!) 154/96   10/31/19 (!) 142/96                 Failed - Normal serum creatinine on file in past 12 months     Recent Labs   Lab Test 10/28/21  1454   CR 1.33*       Ok to refill medication if creatinine is low          Passed - Recent (12 mo) or future (30 days) visit within the authorizing provider's specialty     Patient has had an office visit with the authorizing provider or a provider within the authorizing providers department within the previous 12 mos or has a future within next 30 days. See \"Patient Info\" tab in inbasket, or \"Choose Columns\" in Meds & Orders section of the refill encounter.              Passed - Medication is active on med list        Passed - Patient is age 18 or older        Passed - " Normal serum potassium on file in past 12 months     Recent Labs   Lab Test 10/28/21  1454   POTASSIUM 4.3                         Kemar Rascon RN 03/03/22 12:48 PM

## 2022-07-09 ENCOUNTER — HEALTH MAINTENANCE LETTER (OUTPATIENT)
Age: 57
End: 2022-07-09

## 2022-09-03 ENCOUNTER — HEALTH MAINTENANCE LETTER (OUTPATIENT)
Age: 57
End: 2022-09-03

## 2023-03-18 DIAGNOSIS — I10 ESSENTIAL (PRIMARY) HYPERTENSION: ICD-10-CM

## 2023-03-19 RX ORDER — LOSARTAN POTASSIUM 100 MG/1
TABLET ORAL
Qty: 30 TABLET | Refills: 2 | OUTPATIENT
Start: 2023-03-18

## 2023-07-22 ENCOUNTER — HEALTH MAINTENANCE LETTER (OUTPATIENT)
Age: 58
End: 2023-07-22

## 2024-09-14 ENCOUNTER — HEALTH MAINTENANCE LETTER (OUTPATIENT)
Age: 59
End: 2024-09-14